# Patient Record
(demographics unavailable — no encounter records)

---

## 2025-07-11 NOTE — DATA REVIEWED
[FreeTextEntry1] : Venous duplex scan performed today revealed no evidence of DVT along the popliteal veins bilaterally.  Chronic superficial thrombosis of the small saphenous veins were noted.  There was no evidence of acute DVT bilaterally.

## 2025-07-11 NOTE — HISTORY OF PRESENT ILLNESS
[FreeTextEntry1] : 71-year-old male with a history of left lower extremity DVT after hip replacement surgery over 10 years ago.  The patient recently developed bilateral lower extremity pain and a venous duplex scan from the emergency room revealed chronic distal popliteal vein DVT.  The patient is chronically anticoagulated for atrial fibrillation.  He has occasional swelling and does not utilize compression stockings.

## 2025-07-11 NOTE — ASSESSMENT
[FreeTextEntry1] : The patient has bilateral lower extremity swelling and stasis skin changes secondary to venous insufficiency.  Lower extremity venous duplex scan performed today reveals no evidence of DVT bilaterally.  There was no evidence of chronic DVT in the popliteal veins bilaterally.  Chronic superficial thrombosis of the small saphenous veins were noted bilaterally.  The patient is chronically anticoagulated for atrial fibrillation.  No vascular surgical intervention required.  The utilization of compression stockings is recommended.  The patient may follow-up in 1 year for reevaluation or on a as needed basis is required.   45 minutes were spent with the patient discussing symptoms, physical exam findings, vascular lab testing results, underlying condition and treatment options.

## 2025-07-11 NOTE — PHYSICAL EXAM
[2+] : left 2+ [Ankle Swelling (On Exam)] : present [Ankle Swelling Bilaterally] : bilaterally  [Varicose Veins Of Lower Extremities] : bilaterally [] : bilaterally [Ankle Swelling On The Right] : mild